# Patient Record
Sex: MALE | Race: WHITE | URBAN - NONMETROPOLITAN AREA
[De-identification: names, ages, dates, MRNs, and addresses within clinical notes are randomized per-mention and may not be internally consistent; named-entity substitution may affect disease eponyms.]

---

## 2018-04-04 NOTE — PROCEDURE NOTE: CLINICAL
PROCEDURE NOTE: Biopsy of Eyelid Right Upper Lid. Diagnosis: Eyelid Lesion, Uncertain Behavior. Anesthesia: Local. Prior to treatment, the risks/benefits/alternatives were discussed. The patient wished to proceed with procedure. The area of the surgical site was prepped surgical scrub. 0.5 cc of 2% lidocaine with epinephrine was injected beneath the lesion. The lesion was excised with Parminder scissors. The defect was cauterized. Erythromycin ointment was placed on the biopsy site. Patient tolerated procedure well. There were no complications. The lesion was placed in formalin and sent to a pathology lab. Post procedure instructions given. Mandi Choi

## 2019-06-11 ENCOUNTER — IMPORTED ENCOUNTER (OUTPATIENT)
Dept: URBAN - NONMETROPOLITAN AREA CLINIC 1 | Facility: CLINIC | Age: 39
End: 2019-06-11

## 2019-06-11 PROBLEM — S05.02XA: Noted: 2019-06-11

## 2019-06-11 PROBLEM — T15.02XA: Noted: 2019-06-11

## 2019-06-11 PROCEDURE — 65222 REMOVE FOREIGN BODY FROM EYE: CPT

## 2019-06-11 PROCEDURE — 99203 OFFICE O/P NEW LOW 30 MIN: CPT

## 2019-06-11 PROCEDURE — 92071 CONTACT LENS FITTING FOR TX: CPT

## 2019-06-11 NOTE — PATIENT DISCUSSION
6 Metallic Foreign Bodies w/ Rust Ring x2 OS-  discussed findings w/patient-  6 metallic FB removed O/84W needle at SL-  2 rust rings removed-  BCL placed OS today -  start Tobradex QID OS x 1 week -  monitor 1 day f/u w/ BCL removal

## 2022-04-09 ASSESSMENT — VISUAL ACUITY
OS_CC: 20/50
OD_CC: 20/40